# Patient Record
Sex: FEMALE | URBAN - METROPOLITAN AREA
[De-identification: names, ages, dates, MRNs, and addresses within clinical notes are randomized per-mention and may not be internally consistent; named-entity substitution may affect disease eponyms.]

---

## 2024-01-29 ENCOUNTER — OFFICE VISIT (OUTPATIENT)
Age: 39
End: 2024-01-29
Payer: COMMERCIAL

## 2024-01-29 VITALS
HEART RATE: 70 BPM | TEMPERATURE: 98 F | HEIGHT: 65 IN | OXYGEN SATURATION: 95 % | WEIGHT: 293 LBS | SYSTOLIC BLOOD PRESSURE: 126 MMHG | DIASTOLIC BLOOD PRESSURE: 63 MMHG | BODY MASS INDEX: 48.82 KG/M2

## 2024-01-29 DIAGNOSIS — G47.33 OBSTRUCTIVE SLEEP APNEA (ADULT) (PEDIATRIC): Primary | ICD-10-CM

## 2024-01-29 DIAGNOSIS — E66.01 OBESITY, MORBID, BMI 50 OR HIGHER (HCC): ICD-10-CM

## 2024-01-29 DIAGNOSIS — E11.9 TYPE 2 DIABETES MELLITUS WITHOUT COMPLICATION, WITHOUT LONG-TERM CURRENT USE OF INSULIN (HCC): ICD-10-CM

## 2024-01-29 PROCEDURE — 99204 OFFICE O/P NEW MOD 45 MIN: CPT | Performed by: INTERNAL MEDICINE

## 2024-01-29 ASSESSMENT — SLEEP AND FATIGUE QUESTIONNAIRES
HOW LIKELY ARE YOU TO NOD OFF OR FALL ASLEEP IN A CAR, WHILE STOPPED FOR A FEW MINUTES IN TRAFFIC: 1
WHAT TIME DO YOU USUALLY WAKE UP: 06:20
AVERAGE NUMBER OF SLEEP HOURS: 6
HOW LIKELY ARE YOU TO NOD OFF OR FALL ASLEEP WHILE LYING DOWN TO REST IN THE AFTERNOON WHEN CIRCUMSTANCES PERMIT: SLIGHT CHANCE OF DOZING
HOW LONG DO YOU NAP: 45 MINUTES TO 1 HOUR
HOW LIKELY ARE YOU TO NOD OFF OR FALL ASLEEP WHILE LYING DOWN TO REST IN THE AFTERNOON WHEN CIRCUMSTANCES PERMIT: 1
HOW LIKELY ARE YOU TO NOD OFF OR FALL ASLEEP WHILE WATCHING TV: HIGH CHANCE OF DOZING
DO YOU HAVE DIFFICULTY OPERATING A MOTOR VEHICLE FOR LONG DISTANCES (GREATER THAN 100 MILES) BECAUSE YOU BECOME SLEEPY: NO
DO YOU HAVE PROBLEMS WITH FREQUENT AWAKENINGS AT NIGHT: YES
ARE YOU BOTHERED BY WAKING UP TOO EARLY AND NOT BEING ABLE TO GET BACK TO SLEEP: NO
NUMBER OF TIMES YOU WAKE PER NIGHT: 4
HOW LIKELY ARE YOU TO NOD OFF OR FALL ASLEEP WHILE SITTING AND TALKING TO SOMEONE: WOULD NEVER DOZE
SELECT ANY OF THE FOLLOWING BEHAVIORS OBSERVED WHILE YOU ARE ASLEEP: GRINDING TEETH
HOW LIKELY ARE YOU TO NOD OFF OR FALL ASLEEP WHILE SITTING QUIETLY AFTER LUNCH WITHOUT ALCOHOL: WOULD NEVER DOZE
DO YOU TAKE NAPS: YES
HOW LIKELY ARE YOU TO NOD OFF OR FALL ASLEEP WHILE SITTING AND READING: MODERATE CHANCE OF DOZING
DO YOU HAVE DIFFICULTY BEING AS ACTIVE AS YOU WANT TO BE IN THE MORNING BECAUSE YOU ARE SLEEPY OR TIRED: YES, EXTREME
DO YOU GET TOO LITTLE SLEEP AT NIGHT: DOES
HOW LIKELY ARE YOU TO NOD OFF OR FALL ASLEEP WHILE SITTING AND TALKING TO SOMEONE: 0
HOW LIKELY ARE YOU TO NOD OFF OR FALL ASLEEP WHILE SITTING INACTIVE IN A PUBLIC PLACE: 0
HOW LIKELY ARE YOU TO NOD OFF OR FALL ASLEEP WHILE WATCHING TV: 3
DO YOU HAVE DIFFICULTY VISITING YOUR FAMILY OR FRIENDS IN THEIR HOME BECAUSE YOU BECOME SLEEPY OR TIRED: NO
SELECT ANY OF THE FOLLOWING BEHAVIORS OBSERVED WHILE YOU ARE ASLEEP: LOUD SNORING
DO YOU HAVE DIFFICULTY OPERATING A MOTOR VEHICLE FOR SHORT DISTANCES (LESS THAN 100 MILES) BECAUSE YOU BECOME SLEEPY: NO
DO YOU HAVE DIFFICULTY WATCHING A MOVIE OR VIDEO BECAUSE YOU BECOME SLEEPY OR TIRED: YES, EXTREME
AVERAGE NUMBER OF SLEEP HOURS: 6
ARE YOU BOTHERED BY WAKING UP TOO EARLY AND NOT BEING ABLE TO GET BACK TO SLEEP: IS NOT
SELECT ANY OF THE FOLLOWING BEHAVIORS OBSERVED WHILE PATIENT ASLEEP: LOUD SNORING;GRINDING TEETH
DO YOU HAVE DIFFICULTY CONCENTRATING ON THE THINGS YOU DO BECAUSE YOU ARE SLEEPY OR TIRED: NO
HOW MANY NAPS DO YOU TAKE PER WEEK: 2
HOW LIKELY ARE YOU TO NOD OFF OR FALL ASLEEP WHILE SITTING QUIETLY AFTER LUNCH WITHOUT ALCOHOL: 0
ESS TOTAL SCORE: 8
HOW LIKELY ARE YOU TO NOD OFF OR FALL ASLEEP IN A CAR, WHILE STOPPED FOR A FEW MINUTES IN TRAFFIC: SLIGHT CHANCE OF DOZING
NECK CIRCUMFERENCE (INCHES): 17
HAS YOUR RELATIONSHIP WITH FAMILY, FRIENDS OR WORK COLLEAGUES BEEN AFFECTED BECAUSE YOU ARE SLEEPY OR TIRED: YES, A LITTLE
HAS YOUR MOOD BEEN AFFECTED BECAUSE YOU ARE SLEEPY OR TIRED: YES, EXTREME
HOW LIKELY ARE YOU TO NOD OFF OR FALL ASLEEP WHILE SITTING INACTIVE IN A PUBLIC PLACE: WOULD NEVER DOZE
HOW LIKELY ARE YOU TO NOD OFF OR FALL ASLEEP WHILE SITTING AND READING: 2
HOW LIKELY ARE YOU TO NOD OFF OR FALL ASLEEP WHEN YOU ARE A PASSENGER IN A CAR FOR AN HOUR WITHOUT A BREAK: SLIGHT CHANCE OF DOZING
DO YOU GENERALLY HAVE DIFFICULTY REMEMBERING THINGS BECAUSE YOU ARE SLEEPY OR TIRED: YES, A LITTLE
DO YOU WORK SHIFTS: NO
DO YOU HAVE DIFFICULTY BEING AS ACTIVE AS YOU WANT TO BE IN THE EVENING BECAUSE YOU ARE SLEEPY OR TIRED: YES, EXTREME
HOW LIKELY ARE YOU TO NOD OFF OR FALL ASLEEP WHEN YOU ARE A PASSENGER IN A CAR FOR AN HOUR WITHOUT A BREAK: 1
FOSQ SCORE: 13
DO YOU GET TOO LITTLE SLEEP AT NIGHT: YES

## 2024-01-29 NOTE — PATIENT INSTRUCTIONS
[address]  [place] [address]  Tel.  [phone]  Fax. [phone] [address]  [place] [address]  Tel.  [phone]  Fax. [phone] [address] [place] [address]  Tel.  [phone]  Fax. [phone]     Sleep Apnea: After Your Visit  Your Care Instructions  Sleep apnea occurs when you frequently stop breathing for 10 seconds or longer during sleep. It can be mild to severe, based on the number of times per hour that you stop breathing or have slowed breathing. Blocked or narrowed airways in your nose, mouth, or throat can cause sleep apnea. Your airway can become blocked when your throat muscles and tongue relax during sleep.  Sleep apnea is common, occurring in 1 out of 20 individuals.  Individuals having any of the following characteristics should be evaluated and treated right away due to high risk and detrimental consequences from untreated sleep apnea:  Obesity  Congestive Heart failure  Atrial Fibrillation  Uncontrolled Hypertension  Type II Diabetes  Night-time Arrhythmias  Stroke  Pulmonary Hypertension  High-risk Driving Populations (pilots, truck drivers, etc.)  Patients Considering Weight-loss Surgery    How do you know you have sleep apnea?  You probably have sleep apnea if you answer 'yes' to 3 or more of the following questions:  S - Have you been told that you Snore?   T - Are you often Tired during the day?  O - Has anyone Observed you stop breathing while sleeping?  P- Do you have (or are being treated for) high blood Pressure?    B - Are you obese (Body Mass Index > 35)?  A - Is your Age 50 years old or older?  N - Is your Neck size greater than 16 inches?  G - Are you male Gender?  A sleep physician can prescribe a breathing device that prevents tissues in the throat from blocking your airway. Or your doctor may recommend using a dental device (oral breathing device) to help keep your airway open. In some cases, surgery may

## 2024-01-29 NOTE — PROGRESS NOTES
She  has a past medical history of Diabetes ([facility]ly [facility]).    She  has no past surgical history on file.    She family history includes Diabetes in her father and mother; Heart Disease in her father; High Blood Pressure in her father; Hypertension in her mother.    She  reports that she has never smoked. She has never used smokeless tobacco. She reports current alcohol use. She reports that she does not use drugs.     Review of Systems:  Constitutional: + weight gain.  Eyes:  No blurred vision.  CVS:  No significant chest pain  Pulm:  No significant shortness of breath  GI:  No significant nausea or vomiting  :  + significant nocturia  Musculoskeletal:  No significant joint pain at night  Skin:  No significant rashes  Neuro:  No significant dizziness   Psych: +anxiety    Sleep Review of Systems: notable for no difficulty falling asleep; +frequent awakenings at night;  regular dreaming noted; no nightmares ; no early morning headaches; no memory problems; no concentration issues; no history of any automobile or occupational accidents due to daytime drowsiness.      Objective:   /63 (Site: Left Upper Arm, Position: Sitting, Cuff Size: Large Adult)   Pulse 70   Temp 98 °F (36.7 °C)   Ht 1.651 m (5' 5\")   Wt (!) 176 kg (388 lb)   SpO2 95%   BMI 64.57 kg/m²       General:   Not in acute distress   Eyes:  Anicteric sclerae, no obvious strabismus   Nose:  No obvious nasal septum deviation    Oropharynx:   Class 3 oropharyngeal outlet, thick tongue base, enlarged and boggy uvula, low-lying soft palate, narrow tonsilo-pharyngeal pilars   Tonsils:   tonsils are present and normal   Neck:    ; 17 inches;midline trachea   Chest/Lungs:  Equal lung expansion, clear on auscultation    CVS:  Normal rate, regular rhythm; no JVD   Skin:  Warm to touch; no obvious rashes   Neuro:  No focal deficits ; no obvious tremor    Psych:  Normal affect,  normal countenance;          Assessment:      Diagnosis Orders   1. Obstructive

## 2024-02-23 ENCOUNTER — PROCEDURE VISIT (OUTPATIENT)
Age: 39
End: 2024-02-23

## 2024-02-23 ENCOUNTER — HOSPITAL ENCOUNTER (OUTPATIENT)
Facility: HOSPITAL | Age: 39
Discharge: HOME | End: 2024-02-26
Payer: COMMERCIAL

## 2024-02-23 DIAGNOSIS — G47.33 OSA (OBSTRUCTIVE SLEEP APNEA): Primary | ICD-10-CM

## 2024-02-23 PROCEDURE — 95800 SLP STDY UNATTENDED: CPT | Performed by: INTERNAL MEDICINE

## 2024-02-23 NOTE — PROGRESS NOTES
Berta Guaman is seen today to receive a WatchPAT home sleep apnea testing (HSAT) device.    The WatchPAT HSAT Agreement was reviewed and endorsed by patient.  General information regarding operation of the HSAT device was provided.  Patient was advised to watch the WatchPAT instructional video.  Patient was advised to contact patient support for any problems using the device.  Patient was advised to complete the Morning Questionnaire after awakening/ending the test.    There were no vitals taken for this visit.    Patient will return the home sleep testing unit by noon unless otherwise approved.  Patient will be contacted once the results have been reviewed by the physician, typically within 10-15 business days.    IntrinsityPAT Serial Number [id number]

## 2024-02-26 ENCOUNTER — TELEPHONE (OUTPATIENT)
Age: 39
End: 2024-02-26

## 2024-02-26 DIAGNOSIS — G47.33 OBSTRUCTIVE SLEEP APNEA (ADULT) (PEDIATRIC): Primary | ICD-10-CM

## 2024-02-27 ENCOUNTER — CLINICAL DOCUMENTATION (OUTPATIENT)
Age: 39
End: 2024-02-27

## 2024-02-27 NOTE — TELEPHONE ENCOUNTER
HSAT in r-drive.    Tech to convey results to patient    Watchpat HSAT positive for severe sleep apnea.       AHI 74/hour and lowest oxygen saturation was 70%. We had discussed treatment options at initial consultation. Based on the results of the home sleep apnea test,  a trial of APAP would be an effective mode of therapy. APAP order attached. she should be seen in the sleep disorder center 4-6 weeks after initiating PAP therapy.     her respiratory events were worse when sleeping on back. she should avoid sleeping on her back until she is on PAP therapy.      Front staff to Order PAP and call patient and let them know which DME company they should be hearing from after results reviewed with lead support technologist.     she will need a first adherence visit.     Staff to schedule first adherence visit

## 2024-03-05 ENCOUNTER — HOSPITAL ENCOUNTER (OUTPATIENT)
Facility: HOSPITAL | Age: 39
Discharge: HOME | End: 2024-03-08
Payer: COMMERCIAL

## 2024-03-05 ENCOUNTER — TRANSCRIBE ORDERS (OUTPATIENT)
Facility: HOSPITAL | Age: 39
End: 2024-03-05

## 2024-03-05 DIAGNOSIS — R06.02 SOB (SHORTNESS OF BREATH) ON EXERTION: ICD-10-CM

## 2024-03-05 DIAGNOSIS — R06.02 SOB (SHORTNESS OF BREATH) ON EXERTION: Primary | ICD-10-CM

## 2024-03-05 PROCEDURE — 71046 X-RAY EXAM CHEST 2 VIEWS: CPT

## 2024-03-15 ENCOUNTER — HOSPITAL ENCOUNTER (OUTPATIENT)
Facility: HOSPITAL | Age: 39
Discharge: HOME | End: 2024-03-15
Payer: COMMERCIAL

## 2024-03-15 ENCOUNTER — TELEPHONE (OUTPATIENT)
Age: 39
End: 2024-03-15

## 2024-03-15 DIAGNOSIS — E04.1 THYROID NODULE: ICD-10-CM

## 2024-03-15 PROCEDURE — 76536 US EXAM OF HEAD AND NECK: CPT

## 2024-06-21 ASSESSMENT — SLEEP AND FATIGUE QUESTIONNAIRES
HOW LIKELY ARE YOU TO NOD OFF OR FALL ASLEEP WHILE SITTING INACTIVE IN A PUBLIC PLACE: WOULD NEVER DOZE
ESS TOTAL SCORE: 6
HOW LIKELY ARE YOU TO NOD OFF OR FALL ASLEEP WHILE SITTING AND READING: MODERATE CHANCE OF DOZING
HOW LIKELY ARE YOU TO NOD OFF OR FALL ASLEEP WHILE SITTING AND TALKING TO SOMEONE: WOULD NEVER DOZE
HOW LIKELY ARE YOU TO NOD OFF OR FALL ASLEEP WHEN YOU ARE A PASSENGER IN A CAR FOR AN HOUR WITHOUT A BREAK: SLIGHT CHANCE OF DOZING
DO YOU GENERALLY HAVE DIFFICULTY REMEMBERING THINGS BECAUSE YOU ARE SLEEPY OR TIRED: NO
HAS YOUR MOOD BEEN AFFECTED BECAUSE YOU ARE SLEEPY OR TIRED: YES, LITTLE
HOW LIKELY ARE YOU TO NOD OFF OR FALL ASLEEP WHILE LYING DOWN TO REST IN THE AFTERNOON WHEN CIRCUMSTANCES PERMIT: SLIGHT CHANCE OF DOZING
DO YOU HAVE DIFFICULTY VISITING YOUR FAMILY OR FRIENDS IN THEIR HOME BECAUSE YOU BECOME SLEEPY OR TIRED: NO
HOW LIKELY ARE YOU TO NOD OFF OR FALL ASLEEP WHILE SITTING INACTIVE IN A PUBLIC PLACE: WOULD NEVER DOZE
HOW LIKELY ARE YOU TO NOD OFF OR FALL ASLEEP IN A CAR, WHILE STOPPED FOR A FEW MINUTES IN TRAFFIC: WOULD NEVER DOZE
HOW LIKELY ARE YOU TO NOD OFF OR FALL ASLEEP IN A CAR, WHILE STOPPED FOR A FEW MINUTES IN TRAFFIC: WOULD NEVER DOZE
DO YOU HAVE DIFFICULTY OPERATING A MOTOR VEHICLE FOR LONG DISTANCES (GREATER THAN 100 MILES) BECAUSE YOU BECOME SLEEPY: NO
HOW LIKELY ARE YOU TO NOD OFF OR FALL ASLEEP WHILE WATCHING TV: MODERATE CHANCE OF DOZING
HOW LIKELY ARE YOU TO NOD OFF OR FALL ASLEEP WHILE WATCHING TV: MODERATE CHANCE OF DOZING
HAS YOUR RELATIONSHIP WITH FAMILY, FRIENDS OR WORK COLLEAGUES BEEN AFFECTED BECAUSE YOU ARE SLEEPY OR TIRED: NO
HOW LIKELY ARE YOU TO NOD OFF OR FALL ASLEEP WHILE SITTING QUIETLY AFTER LUNCH WITHOUT ALCOHOL: WOULD NEVER DOZE
DO YOU HAVE DIFFICULTY OPERATING A MOTOR VEHICLE FOR SHORT DISTANCES (LESS THAN 100 MILES) BECAUSE YOU BECOME SLEEPY: NO
DO YOU HAVE DIFFICULTY CONCENTRATING ON THE THINGS YOU DO BECAUSE YOU ARE SLEEPY OR TIRED: YES, A LITTLE
DO YOU HAVE DIFFICULTY BEING AS ACTIVE AS YOU WANT TO BE IN THE EVENING BECAUSE YOU ARE SLEEPY OR TIRED: YES, MODERATE
HOW LIKELY ARE YOU TO NOD OFF OR FALL ASLEEP WHILE SITTING AND READING: MODERATE CHANCE OF DOZING
HOW LIKELY ARE YOU TO NOD OFF OR FALL ASLEEP WHILE SITTING QUIETLY AFTER LUNCH WITHOUT ALCOHOL: WOULD NEVER DOZE
FOSQ SCORE: 16.5
HOW LIKELY ARE YOU TO NOD OFF OR FALL ASLEEP WHEN YOU ARE A PASSENGER IN A CAR FOR AN HOUR WITHOUT A BREAK: SLIGHT CHANCE OF DOZING
DO YOU HAVE DIFFICULTY WATCHING A MOVIE OR VIDEO BECAUSE YOU BECOME SLEEPY OR TIRED: YES, MODERATE
HOW LIKELY ARE YOU TO NOD OFF OR FALL ASLEEP WHILE SITTING AND TALKING TO SOMEONE: WOULD NEVER DOZE
DO YOU HAVE DIFFICULTY BEING AS ACTIVE AS YOU WANT TO BE IN THE MORNING BECAUSE YOU ARE SLEEPY OR TIRED: YES, LITTLE
HOW LIKELY ARE YOU TO NOD OFF OR FALL ASLEEP WHILE LYING DOWN TO REST IN THE AFTERNOON WHEN CIRCUMSTANCES PERMIT: SLIGHT CHANCE OF DOZING

## 2024-06-24 ENCOUNTER — CLINICAL DOCUMENTATION (OUTPATIENT)
Age: 39
End: 2024-06-24

## 2024-06-24 ENCOUNTER — TELEMEDICINE (OUTPATIENT)
Age: 39
End: 2024-06-24
Payer: COMMERCIAL

## 2024-06-24 DIAGNOSIS — G47.33 OBSTRUCTIVE SLEEP APNEA (ADULT) (PEDIATRIC): Primary | ICD-10-CM

## 2024-06-24 PROCEDURE — 99213 OFFICE O/P EST LOW 20 MIN: CPT | Performed by: INTERNAL MEDICINE

## 2024-06-24 NOTE — PROGRESS NOTES
She admits that her sleep has improved on PAP therapy using nasal cushion mask   No Known Allergies    She has a current medication list which includes the following prescription(s): metformin hcl..      She  has a past medical history of Diabetes (HCC).             6/21/2024    10:44 PM 1/29/2024    11:07 AM   Sleep Medicine   Sitting and reading 2 2   Watching TV 2 3   Sitting, inactive in a public place (e.g. a theatre or a meeting) 0 0   As a passenger in a car for an hour without a break 1 1   Lying down to rest in the afternoon when circumstances permit 1 1   Sitting and talking to someone 0 0   Sitting quietly after a lunch without alcohol 0 0   In a car, while stopped for a few minutes in traffic 0 1   New Era Sleepiness Score 6 8   Neck circumference (Inches)  17          which reflect improved sleep quality over therapy time.    O>      There were no vitals taken for this visit.      Vital Signs: (As obtained by patient/caregiver at home)        Constitutional: [x] Appears well-developed and well-nourished [x] No apparent distress      [] Abnormal -     Mental status: [x] Alert and awake  [x] Oriented to person/place/time [x] Able to follow commands    [] Abnormal -     Eyes:   EOM    [x]  Normal    [] Abnormal -   Sclera  [x]  Normal    [] Abnormal -          Discharge [x]  None visible   [] Abnormal -     HENT: [x] Normocephalic, atraumatic      External Ears [x] Normal  [] Abnormal -    Neck: [x] No visualized mass [] Abnormal -     Pulmonary/Chest: [x] Respiratory effort normal   [x] No visualized signs of difficulty breathing or respiratory distress        [] Abnormal -       Neurological:        [x] No Facial Asymmetry (Cranial nerve 7 motor function) (limited exam due to video visit)          [x] No gaze palsy        [] Abnormal -          Skin:        [x] No significant exanthematous lesions or discoloration noted on facial skin         [] Abnormal -            Psychiatric:       [x] Normal